# Patient Record
Sex: FEMALE | Race: WHITE | Employment: OTHER | URBAN - METROPOLITAN AREA
[De-identification: names, ages, dates, MRNs, and addresses within clinical notes are randomized per-mention and may not be internally consistent; named-entity substitution may affect disease eponyms.]

---

## 2023-11-09 ENCOUNTER — OFFICE VISIT (OUTPATIENT)
Dept: PODIATRY | Age: 83
End: 2023-11-09

## 2023-11-09 VITALS — BODY MASS INDEX: 29.37 KG/M2 | WEIGHT: 172 LBS | HEIGHT: 64 IN

## 2023-11-09 DIAGNOSIS — I73.9 PVD (PERIPHERAL VASCULAR DISEASE) (HCC): ICD-10-CM

## 2023-11-09 DIAGNOSIS — L30.9 ACUTE DERMATITIS: Primary | ICD-10-CM

## 2023-11-09 DIAGNOSIS — D23.71 BENIGN NEOPLASM OF SKIN OF LOWER LIMB, INCLUDING HIP, RIGHT: ICD-10-CM

## 2023-11-09 DIAGNOSIS — M79.672 PAIN IN BOTH FEET: ICD-10-CM

## 2023-11-09 DIAGNOSIS — R26.9 GAIT ABNORMALITY: ICD-10-CM

## 2023-11-09 DIAGNOSIS — B35.1 DERMATOPHYTOSIS OF NAIL: ICD-10-CM

## 2023-11-09 DIAGNOSIS — M79.671 PAIN IN BOTH FEET: ICD-10-CM

## 2023-11-09 RX ORDER — CLOBETASOL PROPIONATE 0.5 MG/G
OINTMENT TOPICAL
Qty: 45 G | Refills: 2 | Status: SHIPPED | OUTPATIENT
Start: 2023-11-09

## 2023-11-09 NOTE — PROGRESS NOTES
[address] [facility] PODIATRY [place] [address] [place] [address]  Dept: [phone]  Dept Fax: [phone]     FOOT PAIN & BENIGN NEOPLASM PROGRESS NOTE  Date of patient's visit: 11/9/2023  Patient's Name:  Yanira Elliott YOB: 1940            Patient Care Team:  Daphney Bello MD as PCP - General  Jenifer Meter, DPTOMER as Physician (Podiatry)          Chief Complaint   Patient presents with    Foot Pain     Bilateral foot    Benign Neoplasm     Bilateral foot    Nail Problem     Toenail trim       Subjective: This Yanira Elliott comes to clinic for foot and nail care. Pt currently has complaint of thickened, painful, elongated nails that he/she cannot manage by themselves. Pt. Relates pain to nails with shoe gear. Pt's primary care physician is Daphney Bello, [address] seen  6/7/23. Past Medical History:   Diagnosis Date    A-fib [facility])     Arthritis     CAD (coronary artery disease)     Hypertension    Yanira Elliott is a 81 y.o. female. Painful lesions her   feet. . They have been present for 2 month(s) duration. The lesions are present on the bilateral foot. The patient has 2 lesion that is deep seated and has a painful core. Treatment has included pads and tape     Pt has a new complaint of lower extremity dryness and rash - current ointment not effective.           Allergies   Allergen Reactions    Celecoxib Diarrhea     Severe diarrhea     Indapamide Rash     Current Outpatient Medications on File Prior to Visit   Medication Sig Dispense Refill    coenzyme Q10 100 MG CAPS capsule 1 capsule      lisinopril (PRINIVIL;ZESTRIL) 5 MG tablet Take 1 tablet by mouth daily 30 tablet 3    sodium chloride (ALTAMIST SPRAY) 0.65 % nasal spray 1 spray by Nasal route as needed for Congestion 1 Bottle 3    pravastatin (PRAVACHOL) 10 MG tablet Take 1 tablet by mouth daily      acetaminophen (TYLENOL) 325 MG tablet Take

## 2024-01-18 ENCOUNTER — OFFICE VISIT (OUTPATIENT)
Dept: PODIATRY | Age: 84
End: 2024-01-18

## 2024-01-18 VITALS — WEIGHT: 172 LBS | HEIGHT: 64 IN | BODY MASS INDEX: 29.37 KG/M2

## 2024-01-18 DIAGNOSIS — L30.9 ACUTE DERMATITIS: ICD-10-CM

## 2024-01-18 DIAGNOSIS — R26.9 GAIT ABNORMALITY: ICD-10-CM

## 2024-01-18 DIAGNOSIS — D23.71 BENIGN NEOPLASM OF SKIN OF LOWER LIMB, INCLUDING HIP, RIGHT: ICD-10-CM

## 2024-01-18 DIAGNOSIS — M79.671 PAIN IN BOTH FEET: ICD-10-CM

## 2024-01-18 DIAGNOSIS — D23.72 BENIGN NEOPLASM OF SKIN OF LOWER LIMB, INCLUDING HIP, LEFT: ICD-10-CM

## 2024-01-18 DIAGNOSIS — I73.9 PVD (PERIPHERAL VASCULAR DISEASE) (HCC): ICD-10-CM

## 2024-01-18 DIAGNOSIS — M79.672 PAIN IN BOTH FEET: ICD-10-CM

## 2024-01-18 DIAGNOSIS — B35.1 DERMATOPHYTOSIS OF NAIL: Primary | ICD-10-CM

## 2024-01-18 NOTE — PROGRESS NOTES
[facility] PODIATRY [address]  [address]  [facility] [address]  Dept: [phone]  Dept Fax: [phone]     PAIN PROGRESS NOTE  Date of patient's visit: 1/18/2024  Patient's Name:  Apurva Estes YOB: 1940            Patient Care Team:  Cammie Rodriguez MD as PCP - General  Michael Meade DPM as Physician (Podiatry)      Chief Complaint   Patient presents with    Nail Problem     Toenail trim    Benign Neoplasm     Right foot       Subjective:   This Apurva Estes comes to clinic for foot and nail care.  Pt currently has complaint of thickened, painful, elongated nails that he/she cannot manage by themselves.  Pt. Relates pain to nails with shoe gear.  Pt's primary care physician is Cammie Rodriguez MD last seen 06/07/2023.      Pt also relates to painful skin spots to the bottom of both feet.  Pt has tried pads and changing shoes but it has note helped the pain    Pt has a new complaint of none today .   Past Medical History:   Diagnosis Date    A-fib (HCC)     Arthritis     CAD (coronary artery disease)     Hypertension        Allergies   Allergen Reactions    Celecoxib Diarrhea     Severe diarrhea     Indapamide Rash     Current Outpatient Medications on File Prior to Visit   Medication Sig Dispense Refill    clobetasol (TEMOVATE) 0.05 % ointment Apply topically 2 times daily. 45 g 2    coenzyme Q10 100 MG CAPS capsule 1 capsule      lisinopril (PRINIVIL;ZESTRIL) 5 MG tablet Take 1 tablet by mouth daily 30 tablet 3    sodium chloride (ALTAMIST SPRAY) 0.65 % nasal spray 1 spray by Nasal route as needed for Congestion 1 Bottle 3    pravastatin (PRAVACHOL) 10 MG tablet Take 1 tablet by mouth daily      acetaminophen (TYLENOL) 325 MG tablet Take 2 tablets by mouth every 4 hours as needed for Pain 120 tablet 3    metoprolol tartrate (LOPRESSOR) 25 MG tablet Take 1 tablet by mouth 2 times daily 60 tablet 3

## 2024-06-27 ENCOUNTER — OFFICE VISIT (OUTPATIENT)
Dept: PODIATRY | Age: 84
End: 2024-06-27
Payer: MEDICARE

## 2024-06-27 VITALS — HEIGHT: 64 IN | BODY MASS INDEX: 29.37 KG/M2 | WEIGHT: 172 LBS

## 2024-06-27 DIAGNOSIS — R26.9 GAIT ABNORMALITY: ICD-10-CM

## 2024-06-27 DIAGNOSIS — D23.72 BENIGN NEOPLASM OF SKIN OF LOWER LIMB, INCLUDING HIP, LEFT: ICD-10-CM

## 2024-06-27 DIAGNOSIS — D23.71 BENIGN NEOPLASM OF SKIN OF LOWER LIMB, INCLUDING HIP, RIGHT: ICD-10-CM

## 2024-06-27 DIAGNOSIS — M79.671 PAIN IN BOTH FEET: ICD-10-CM

## 2024-06-27 DIAGNOSIS — I73.9 PVD (PERIPHERAL VASCULAR DISEASE) (HCC): ICD-10-CM

## 2024-06-27 DIAGNOSIS — M79.672 PAIN IN BOTH FEET: ICD-10-CM

## 2024-06-27 DIAGNOSIS — B35.1 DERMATOPHYTOSIS OF NAIL: Primary | ICD-10-CM

## 2024-06-27 PROCEDURE — 11721 DEBRIDE NAIL 6 OR MORE: CPT | Performed by: PODIATRIST

## 2024-06-27 PROCEDURE — 17110 DESTRUCTION B9 LES UP TO 14: CPT | Performed by: PODIATRIST

## 2024-06-27 NOTE — PROGRESS NOTES
[facility] PODIATRY [address]  [address]  [place] [id number]  Dept: [phone]  Dept Fax: [phone]     PAIN PROGRESS NOTE  Date of patient's visit: 6/27/2024  Patient's Name:  Apurva Estes YOB: 1940            Patient Care Team:  Cammie Rodriguez MD as PCP - General  Michael Meade DPM as Physician (Podiatry)      Chief Complaint   Patient presents with    Nail Problem     Toenail trim       Subjective:   This Apurva Estes comes to clinic for foot and nail care.  Pt currently has complaint of thickened, painful, elongated nails that he/she cannot manage by themselves.  Pt. Relates pain to nails with shoe gear.  Pt's primary care physician is Cammie Rodriguez MD last seen 01/25/2024.  Pt has a new complaint of none today .  .   Past Medical History:   Diagnosis Date    A-fib (HCC)     Arthritis     CAD (coronary artery disease)     Hypertension        Allergies   Allergen Reactions    Celecoxib Diarrhea     Severe diarrhea     Indapamide Rash     Current Outpatient Medications on File Prior to Visit   Medication Sig Dispense Refill    coenzyme Q10 100 MG CAPS capsule 1 capsule      lisinopril (PRINIVIL;ZESTRIL) 5 MG tablet Take 1 tablet by mouth daily 30 tablet 3    pravastatin (PRAVACHOL) 10 MG tablet Take 1 tablet by mouth daily      acetaminophen (TYLENOL) 325 MG tablet Take 2 tablets by mouth every 4 hours as needed for Pain 120 tablet 3    metoprolol tartrate (LOPRESSOR) 25 MG tablet Take 1 tablet by mouth 2 times daily 60 tablet 3    apixaban (ELIQUIS) 5 MG TABS tablet Take 1 tablet by mouth 2 times daily 60 tablet 3    calcium-vitamin D (OSCAL-500) 500-200 MG-UNIT per tablet Take 1 tablet by mouth 3 times daily      clobetasol (TEMOVATE) 0.05 % ointment Apply topically 2 times daily. (Patient not taking: Reported on 6/27/2024) 45 g 2     No current facility-administered medications on file

## 2024-09-05 ENCOUNTER — OFFICE VISIT (OUTPATIENT)
Dept: PODIATRY | Age: 84
End: 2024-09-05
Payer: MEDICARE

## 2024-09-05 VITALS — HEIGHT: 64 IN | BODY MASS INDEX: 29.37 KG/M2 | WEIGHT: 172 LBS

## 2024-09-05 DIAGNOSIS — I73.9 PVD (PERIPHERAL VASCULAR DISEASE) (HCC): ICD-10-CM

## 2024-09-05 DIAGNOSIS — M79.671 PAIN IN BOTH FEET: ICD-10-CM

## 2024-09-05 DIAGNOSIS — R26.9 GAIT ABNORMALITY: ICD-10-CM

## 2024-09-05 DIAGNOSIS — D23.72 BENIGN NEOPLASM OF SKIN OF LOWER LIMB, INCLUDING HIP, LEFT: ICD-10-CM

## 2024-09-05 DIAGNOSIS — B35.1 DERMATOPHYTOSIS OF NAIL: ICD-10-CM

## 2024-09-05 DIAGNOSIS — D23.71 BENIGN NEOPLASM OF SKIN OF LOWER LIMB, INCLUDING HIP, RIGHT: Primary | ICD-10-CM

## 2024-09-05 DIAGNOSIS — M79.672 PAIN IN BOTH FEET: ICD-10-CM

## 2024-09-05 PROCEDURE — 17110 DESTRUCTION B9 LES UP TO 14: CPT | Performed by: PODIATRIST

## 2024-09-05 PROCEDURE — 11721 DEBRIDE NAIL 6 OR MORE: CPT | Performed by: PODIATRIST

## 2024-12-05 ENCOUNTER — OFFICE VISIT (OUTPATIENT)
Dept: PODIATRY | Age: 84
End: 2024-12-05
Payer: MEDICARE

## 2024-12-05 VITALS — WEIGHT: 172 LBS | BODY MASS INDEX: 29.37 KG/M2 | HEIGHT: 64 IN

## 2024-12-05 DIAGNOSIS — B35.1 DERMATOPHYTOSIS OF NAIL: Primary | ICD-10-CM

## 2024-12-05 DIAGNOSIS — M79.672 PAIN IN BOTH FEET: ICD-10-CM

## 2024-12-05 DIAGNOSIS — R26.9 GAIT ABNORMALITY: ICD-10-CM

## 2024-12-05 DIAGNOSIS — M79.671 PAIN IN BOTH FEET: ICD-10-CM

## 2024-12-05 DIAGNOSIS — D23.71 BENIGN NEOPLASM OF SKIN OF LOWER LIMB, INCLUDING HIP, RIGHT: ICD-10-CM

## 2024-12-05 DIAGNOSIS — D23.72 BENIGN NEOPLASM OF SKIN OF LOWER LIMB, INCLUDING HIP, LEFT: ICD-10-CM

## 2024-12-05 DIAGNOSIS — L30.9 ACUTE DERMATITIS: ICD-10-CM

## 2024-12-05 DIAGNOSIS — I73.9 PVD (PERIPHERAL VASCULAR DISEASE) (HCC): ICD-10-CM

## 2024-12-05 PROCEDURE — 11721 DEBRIDE NAIL 6 OR MORE: CPT | Performed by: PODIATRIST

## 2024-12-05 PROCEDURE — 17110 DESTRUCTION B9 LES UP TO 14: CPT | Performed by: PODIATRIST

## 2024-12-05 NOTE — PROGRESS NOTES
DEBRIDEMENT OF NAILS, 6 OR MORE      4. Benign neoplasm of skin of lower limb, including hip, right  DESTRUC BENIGN LESION, UP TO 14 LESIONS      5. Benign neoplasm of skin of lower limb, including hip, left  DESTRUC BENIGN LESION, UP TO 14 LESIONS      6. Gait abnormality  DESTRUC BENIGN LESION, UP TO 14 LESIONS    DEBRIDEMENT OF NAILS, 6 OR MORE      7. Acute dermatitis  DESTRUC BENIGN LESION, UP TO 14 LESIONS    DEBRIDEMENT OF NAILS, 6 OR MORE             Plan:   Pt was evaluated and examined. Patient was given personalized discharge instructions.  Nails 1-10 were debrided in length and thickness sharply with a nail nipper and  without incident. Pt will follow up in 9 weeks or sooner if any problems arise. Diagnosis was discussed with the pt and all of their questions were answered in detail. Proper foot hygiene and care was discussed with the pt. Patient to check feet daily and contact the office with any questions/problems/concerns.  Other comorbidity noted and will be managed by PCP.  Pain waiver discussed with patient and confirmed.   12/5/2024    The lesion was partially debrided and silver nitrate was applied with an apperature pad under occlusion. The patient will leave in place for 24-48 hours and than remove. The patient tolerated the procedure well and without complication.   Pt will follow up in 9 weeks         Electronically signed by Michael Meade DPM on 12/5/2024 at 2:25 PM  12/5/2024

## 2025-03-06 ENCOUNTER — OFFICE VISIT (OUTPATIENT)
Dept: PODIATRY | Age: 85
End: 2025-03-06

## 2025-03-06 VITALS — WEIGHT: 172 LBS | BODY MASS INDEX: 29.37 KG/M2 | HEIGHT: 64 IN

## 2025-03-06 DIAGNOSIS — B35.1 DERMATOPHYTOSIS OF NAIL: Primary | ICD-10-CM

## 2025-03-06 DIAGNOSIS — D23.71 BENIGN NEOPLASM OF SKIN OF LOWER LIMB, INCLUDING HIP, RIGHT: ICD-10-CM

## 2025-03-06 DIAGNOSIS — R26.9 GAIT ABNORMALITY: ICD-10-CM

## 2025-03-06 DIAGNOSIS — D23.72 BENIGN NEOPLASM OF SKIN OF LOWER LIMB, INCLUDING HIP, LEFT: ICD-10-CM

## 2025-03-06 DIAGNOSIS — M79.671 PAIN IN BOTH FEET: ICD-10-CM

## 2025-03-06 DIAGNOSIS — I73.9 PVD (PERIPHERAL VASCULAR DISEASE): ICD-10-CM

## 2025-03-06 DIAGNOSIS — M79.672 PAIN IN BOTH FEET: ICD-10-CM

## 2025-03-13 NOTE — PROGRESS NOTES
14 LESIONS      3. Benign neoplasm of skin of lower limb, including hip, right  DESTRUC BENIGN LESION, UP TO 14 LESIONS      4. Benign neoplasm of skin of lower limb, including hip, left  DESTRUC BENIGN LESION, UP TO 14 LESIONS      5. PVD (peripheral vascular disease)  DEBRIDEMENT OF NAILS, 6 OR MORE    DESTRUC BENIGN LESION, UP TO 14 LESIONS      6. Gait abnormality  DEBRIDEMENT OF NAILS, 6 OR MORE    DESTRUC BENIGN LESION, UP TO 14 LESIONS             Plan:   Pt was evaluated and examined. Patient was given personalized discharge instructions.  Nails 1-10 were debrided in length and thickness sharply with a nail nipper and  without incident. Pt will follow up in 9 weeks or sooner if any problems arise. Diagnosis was discussed with the pt and all of their questions were answered in detail. Proper foot hygiene and care was discussed with the pt. Patient to check feet daily and contact the office with any questions/problems/concerns.  Other comorbidity noted and will be managed by PCP.  Pain waiver discussed with patient and confirmed.   3/6/2025    The lesion was partially debrided and silver nitrate was applied with an apperature pad under occlusion. The patient will leave in place for 24-48 hours and than remove. The patient tolerated the procedure well and without complication.   Pt will follow up in 9 weeks         Electronically signed by Michael Meade DPM on 3/6/2025

## 2025-06-19 ENCOUNTER — OFFICE VISIT (OUTPATIENT)
Dept: PODIATRY | Age: 85
End: 2025-06-19
Payer: MEDICARE

## 2025-06-19 VITALS — HEIGHT: 64 IN | BODY MASS INDEX: 29.37 KG/M2 | WEIGHT: 172 LBS

## 2025-06-19 DIAGNOSIS — L30.9 ACUTE DERMATITIS: ICD-10-CM

## 2025-06-19 DIAGNOSIS — R26.9 GAIT ABNORMALITY: ICD-10-CM

## 2025-06-19 DIAGNOSIS — M79.672 PAIN IN BOTH FEET: ICD-10-CM

## 2025-06-19 DIAGNOSIS — B35.1 DERMATOPHYTOSIS OF NAIL: Primary | ICD-10-CM

## 2025-06-19 DIAGNOSIS — M79.671 PAIN IN BOTH FEET: ICD-10-CM

## 2025-06-19 DIAGNOSIS — D23.71 BENIGN NEOPLASM OF SKIN OF LOWER LIMB, INCLUDING HIP, RIGHT: ICD-10-CM

## 2025-06-19 DIAGNOSIS — D23.72 BENIGN NEOPLASM OF SKIN OF LOWER LIMB, INCLUDING HIP, LEFT: ICD-10-CM

## 2025-06-19 DIAGNOSIS — I73.9 PVD (PERIPHERAL VASCULAR DISEASE): ICD-10-CM

## 2025-06-19 PROCEDURE — 17110 DESTRUCTION B9 LES UP TO 14: CPT | Performed by: PODIATRIST

## 2025-06-19 PROCEDURE — 11721 DEBRIDE NAIL 6 OR MORE: CPT | Performed by: PODIATRIST

## 2025-06-19 NOTE — PATIENT INSTRUCTIONS
Schedule a Vaccine  When you qualify to receive the vaccine, call the [facility] COVID-19 Vaccination Hotline to schedule your appointment or to get additional information about the [facility] locations which are offering the COVID-19 vaccine.    To be 94% effective, it's important that you receive two doses of one of the COVID-19 vaccines.  -If you are receiving the Pfizer vaccine, your second shot will be scheduled as close to 21 days after the first shot as possible.  -If you are receiving the Moderna vaccine, your second shot will be scheduled as close to 28 days after the first shot as possible.    [facility] COVID-19 Vaccination Hotline: 976.936.3938    Links to [facility] website and [facility] website:    [email or web address]    [email or web address]

## 2025-06-24 NOTE — PROGRESS NOTES
[facility] PODIATRY [address]  [address]  [place] [id number]  Dept: [phone]  Dept Fax: [phone]     FOOT PAIN & BENIGN NEOPLASM PROGRESS NOTE  Date of patient's visit: 6/19/2025  Patient's Name:  Apurva Estes YOB: 1940            Patient Care Team:  Cammie Rodriguez MD as PCP - General  [facility] Rodriguez MD [facility] PCP [facility]  Michael Meade DPM as Physician (Podiatry)          Chief Complaint   Patient presents with    Foot Pain     BL Foot    Nail Problem     Toe nail trim    Toe Pain     Right third toe pain    Benign Neoplasm     BL Foot       Subjective:   This Apurva Estes comes to clinic for foot and nail care.  Pt currently has complaint of thickened, painful, elongated nails that he/she cannot manage by themselves.  Pt. Relates pain to nails with shoe gear.  Pt's primary care physician is Cammie Rodriguez MDlast seen 8/15/24.    Past Medical History:   Diagnosis Date    A-fib (HCC)     Arthritis     CAD (coronary artery disease)     Hypertension    Apurva Estes is a 85 y.o. female. Painful lesions her   feet.. They have been present for 2 month(s) duration.  The lesions are present on the bilateral foot. The patient has 4 lesion that is deep seated and has a painful core. Treatment has included pad and tape     Pt has a new complaint of none today .         Allergies   Allergen Reactions    Celecoxib Diarrhea     Severe diarrhea     Indapamide Rash     Current Outpatient Medications on File Prior to Visit   Medication Sig Dispense Refill    clobetasol (TEMOVATE) 0.05 % ointment Apply topically 2 times daily. 45 g 2    coenzyme Q10 100 MG CAPS capsule 1 capsule      lisinopril (PRINIVIL;ZESTRIL) 5 MG tablet Take 1 tablet by mouth daily 30 tablet 3    pravastatin (PRAVACHOL) 10 MG tablet Take 1 tablet by mouth daily      acetaminophen (TYLENOL) 325 MG tablet Take 2 tablets by